# Patient Record
Sex: MALE | Race: BLACK OR AFRICAN AMERICAN | NOT HISPANIC OR LATINO
[De-identification: names, ages, dates, MRNs, and addresses within clinical notes are randomized per-mention and may not be internally consistent; named-entity substitution may affect disease eponyms.]

---

## 2017-07-25 ENCOUNTER — APPOINTMENT (OUTPATIENT)
Dept: HEART AND VASCULAR | Facility: CLINIC | Age: 52
End: 2017-07-25
Payer: COMMERCIAL

## 2017-07-25 PROCEDURE — 99203 OFFICE O/P NEW LOW 30 MIN: CPT

## 2017-08-22 ENCOUNTER — APPOINTMENT (OUTPATIENT)
Dept: HEART AND VASCULAR | Facility: CLINIC | Age: 52
End: 2017-08-22
Payer: COMMERCIAL

## 2017-08-22 VITALS
DIASTOLIC BLOOD PRESSURE: 66 MMHG | HEART RATE: 85 BPM | RESPIRATION RATE: 14 BRPM | HEIGHT: 73 IN | BODY MASS INDEX: 32.34 KG/M2 | SYSTOLIC BLOOD PRESSURE: 104 MMHG | WEIGHT: 244.04 LBS | TEMPERATURE: 98.1 F | OXYGEN SATURATION: 96 %

## 2017-08-22 DIAGNOSIS — I10 ESSENTIAL (PRIMARY) HYPERTENSION: ICD-10-CM

## 2017-08-22 DIAGNOSIS — R06.02 SHORTNESS OF BREATH: ICD-10-CM

## 2017-08-22 DIAGNOSIS — Z82.49 FAMILY HISTORY OF ISCHEMIC HEART DISEASE AND OTHER DISEASES OF THE CIRCULATORY SYSTEM: ICD-10-CM

## 2017-08-22 DIAGNOSIS — R94.31 ABNORMAL ELECTROCARDIOGRAM [ECG] [EKG]: ICD-10-CM

## 2017-08-22 PROCEDURE — 93015 CV STRESS TEST SUPVJ I&R: CPT

## 2017-08-22 PROCEDURE — 99214 OFFICE O/P EST MOD 30 MIN: CPT | Mod: 25

## 2017-08-22 RX ORDER — ENALAPRIL MALEATE 10 MG/1
10 TABLET ORAL
Refills: 0 | Status: ACTIVE | COMMUNITY

## 2019-01-26 ENCOUNTER — MOBILE ON CALL (OUTPATIENT)
Age: 54
End: 2019-01-26

## 2021-03-28 ENCOUNTER — EMERGENCY (EMERGENCY)
Facility: HOSPITAL | Age: 56
LOS: 1 days | Discharge: HOME | End: 2021-03-28
Attending: EMERGENCY MEDICINE | Admitting: EMERGENCY MEDICINE
Payer: COMMERCIAL

## 2021-03-28 VITALS
HEIGHT: 73 IN | TEMPERATURE: 98 F | RESPIRATION RATE: 18 BRPM | DIASTOLIC BLOOD PRESSURE: 95 MMHG | OXYGEN SATURATION: 98 % | SYSTOLIC BLOOD PRESSURE: 183 MMHG | HEART RATE: 75 BPM | WEIGHT: 220.02 LBS

## 2021-03-28 PROCEDURE — 99283 EMERGENCY DEPT VISIT LOW MDM: CPT | Mod: 25

## 2021-03-28 PROCEDURE — 99284 EMERGENCY DEPT VISIT MOD MDM: CPT

## 2021-03-28 PROCEDURE — 96372 THER/PROPH/DIAG INJ SC/IM: CPT

## 2021-03-28 RX ORDER — DIAZEPAM 5 MG
5 TABLET ORAL ONCE
Refills: 0 | Status: DISCONTINUED | OUTPATIENT
Start: 2021-03-28 | End: 2021-03-28

## 2021-03-28 RX ORDER — DIAZEPAM 5 MG
1 TABLET ORAL
Qty: 10 | Refills: 0
Start: 2021-03-28

## 2021-03-28 RX ORDER — IBUPROFEN 200 MG
1 TABLET ORAL
Qty: 20 | Refills: 0
Start: 2021-03-28

## 2021-03-28 RX ORDER — KETOROLAC TROMETHAMINE 30 MG/ML
30 SYRINGE (ML) INJECTION ONCE
Refills: 0 | Status: DISCONTINUED | OUTPATIENT
Start: 2021-03-28 | End: 2021-03-28

## 2021-03-28 RX ADMIN — Medication 30 MILLIGRAM(S): at 14:35

## 2021-03-28 RX ADMIN — Medication 5 MILLIGRAM(S): at 14:35

## 2021-03-28 NOTE — ED PROVIDER NOTE - PHYSICAL EXAMINATION
VITAL SIGNS: I have reviewed nursing notes and confirm.  CONSTITUTIONAL: Well-developed; well-nourished; in no acute distress.   SKIN:  Warm and dry, no acute rash.   HEAD:  normocephalic, atraumatic.  EYES: PERRL.  EOM intact; conjunctiva and sclera clear.  ENT: No nasal discharge; airway clear.   NECK: Supple; no midline cervical spine pain/tenderness/stepoff/deformity.   CARD: S1, S2 normal; no murmurs, gallops, or rubs. Regular rate and rhythm.   RESP:  Clear to auscultation b/l, no wheezes, rales or rhonchi.  ABD: Normal bowel sounds; soft; non-distended; non-tender; no guarding/rebound.  EXT: Normal ROM. No clubbing, cyanosis or edema. 2+ pulses to b/l ue/le.  MSK:  + TTP over left low back over left sacrum, no overlying erythema or skin breakdown/rashes.  No midline thoracic or lumbar spine pain/tenderness/stepoff/deformity.   NEURO: Alert, oriented, grossly unremarkable.  5/5 strength x 4 extremities against gravity and external force.  No drift x 4 extremities.  Sensation intact and symmetric x 4 extremities, no saddle anesthesia.  Bilateral patellar DTRs intact and symmetric.  No facial asymmetry.    PSYCH: Cooperative, mood and affect appropriate. VITAL SIGNS: I have reviewed nursing notes and confirm.  CONSTITUTIONAL: Well-developed; well-nourished; in no acute distress.   SKIN:  Warm and dry, no acute rash.   HEAD:  normocephalic, atraumatic.  EYES: PERRL.  EOM intact; conjunctiva and sclera clear.  ENT: No nasal discharge; airway clear.   NECK: Supple; no midline cervical spine pain/tenderness/stepoff/deformity.   CARD: S1, S2 normal; no murmurs, gallops, or rubs. Regular rate and rhythm.   RESP:  Clear to auscultation b/l, no wheezes, rales or rhonchi.  ABD: Normal bowel sounds; soft; non-distended; non-tender; no guarding/rebound.  EXT: Normal ROM. No clubbing, cyanosis or edema. 2+ pulses to b/l ue/le.  MSK:  + TTP over left low back over left iliac crest, no overlying erythema or skin breakdown/rashes.  No midline thoracic or lumbar spine pain/tenderness/stepoff/deformity.   NEURO: Alert, oriented, grossly unremarkable.  5/5 strength x 4 extremities against gravity and external force.  No drift x 4 extremities.  Sensation intact and symmetric x 4 extremities, no saddle anesthesia.  Bilateral patellar DTRs intact and symmetric.  No facial asymmetry.    PSYCH: Cooperative, mood and affect appropriate.

## 2021-03-28 NOTE — ED PROVIDER NOTE - NS ED ROS FT
Constitutional: No fever or chills.   Eyes: No pain, blurry vision, or discharge.  ENMT: No hearing changes, pain, discharge or infections. No neck pain or stiffness.  Cardiac: No chest pain, SOB or edema. No chest pain with exertion.  Respiratory: No cough or respiratory distress. No hemoptysis. No history of asthma or RAD.  GI: No nausea, vomiting, diarrhea or abdominal pain.  : No dysuria, frequency or burning.  MS: No myalgia, muscle weakness, joint pain, + low back pain.  Neuro: No headache or weakness. No LOC.  Skin: No skin rash.   Endocrine: No history of thyroid disease or diabetes.  Except as documented in the HPI, all other systems are negative.

## 2021-03-28 NOTE — ED PROVIDER NOTE - PATIENT PORTAL LINK FT
You can access the FollowMyHealth Patient Portal offered by [facility] by registering at the following website: [email or web address]. By joining [employer]’s FollowMyHealth portal, you will also be able to view your health information using other applications (apps) compatible with our system.

## 2021-03-28 NOTE — ED PROVIDER NOTE - NSFOLLOWUPINSTRUCTIONS_ED_ALL_ED_FT
Please follow up with your primary physician in 1-2 days for re evaluation.  Please return to ER immediately should your symptoms worsen or if you have any concern prior to this recommended follow up.          Acute Low Back Pain    WHAT YOU NEED TO KNOW:    Acute low back pain is sudden discomfort that lasts up to 6 weeks and makes activity difficult.    DISCHARGE INSTRUCTIONS:    Return to the emergency department if:   •You have severe pain.      •You have sudden stiffness and heaviness on both buttocks down to both legs.      •You have numbness or weakness in one leg, or pain in both legs.      •You have numbness in your genital area or across your lower back.      •You cannot control your urine or bowel movements.      Call your doctor if:   •You have a fever.      •You have pain at night or when you rest.      •Your pain does not get better with treatment.      •You have pain that worsens when you cough or sneeze.      •You suddenly feel something pop or snap in your back.      •You have questions or concerns about your condition or care.      Medicines: You may need any of the following:  •NSAIDs, such as ibuprofen, help decrease swelling, pain, and fever. This medicine is available with or without a doctor's order. NSAIDs can cause stomach bleeding or kidney problems in certain people. If you take blood thinner medicine, always ask your healthcare provider if NSAIDs are safe for you. Always read the medicine label and follow directions.      •Acetaminophen decreases pain and fever. It is available without a doctor's order. Ask how much to take and how often to take it. Follow directions. Read the labels of all other medicines you are using to see if they also contain acetaminophen, or ask your doctor or pharmacist. Acetaminophen can cause liver damage if not taken correctly. Do not use more than 4 grams (4,000 milligrams) total of acetaminophen in one day.       •Muscle relaxers decrease pain by relaxing the muscles in your lower spine.      •Prescription pain medicine may be given. Ask your healthcare provider how to take this medicine safely. Some prescription pain medicines contain acetaminophen. Do not take other medicines that contain acetaminophen without talking to your healthcare provider. Too much acetaminophen may cause liver damage. Prescription pain medicine may cause constipation. Ask your healthcare provider how to prevent or treat constipation.       Self-care:   •Stay active as much as you can without causing more pain. Bed rest could make your back pain worse. Start with some light exercises, such as walking. Avoid heavy lifting until your pain is gone. Ask for more information about the activities or exercises that are right for you.   Family Walking for Exercise           •Apply heat on your back for 20 to 30 minutes every 2 hours for as many days as directed. Heat helps decrease pain and muscle spasms. Alternate heat and ice.      •Apply ice on your back for 15 to 20 minutes every hour or as directed. Use an ice pack, or put crushed ice in a plastic bag. Cover it with a towel before you apply it to your skin. Ice helps prevent tissue damage and decreases swelling and pain.      Prevent acute low back pain:   •Use proper body mechanics. ?Bend at the hips and knees when you  objects. Do not bend from the waist. Use your leg muscles as you lift the load. Do not use your back. Keep the object close to your chest as you lift it. Try not to twist or lift anything above your waist.      ?Change your position often when you stand for long periods of time. Rest one foot on a small box or footrest, and then switch to the other foot often.      ?Try not to sit for long periods of time. When you do, sit in a straight-backed chair with your feet flat on the floor. Never reach, pull, or push while you are sitting.      •Do exercises that strengthen your back muscles. Warm up before you exercise. Ask your healthcare provider the best exercises for you.      •Maintain a healthy weight. Ask your healthcare provider what a healthy weight is for you. Ask him or her to help you create a weight loss plan if you are overweight.      Follow up with your doctor as directed: Return for a follow-up visit if you still have pain after 1 to 3 weeks of treatment. You may need to visit an orthopedist if your back pain lasts longer than 12 weeks. Write down your questions so you remember to ask them during your visits.       © Copyright [employer] 2021           back to top                          © Copyright [employer] 2021

## 2021-03-28 NOTE — ED PROVIDER NOTE - OBJECTIVE STATEMENT
55 year old male with history of HTN presents to ED with concern for low back pain radiating into LLE over the past week.  Patient states he was cleaning his stove last week when he "maybe twisted funny" and woke up the next morning with pain to left low back radiating into left buttock and left proximal lower extremity.  Patient notes he went to [facility] where he was prescribed Flexeril for his discomfort and notes he has also intermittently been taking advil with some improvement in pain until today, stating the pain is now feeling worse.  He denies associated paresthesias x 4 extremities (despite triage note reading "right leg numbness"), loss of bowel/bladder function, urinary retention, lower extremity weakness, midline back/spine pain, rashes, recent travel, swelling to extremities or any additional acute complaints or concerns at this time.

## 2021-03-28 NOTE — ED ADULT TRIAGE NOTE - CHIEF COMPLAINT QUOTE
x 1 week of lower back pain. 10/10 lower back pain. pt reports " I was cleaning the oven and I feel like I pulled something." reports L sided lower back pain radiating to L thigh and inner groin. R sided leg numbness started yesterday. no loss/ bowel movements

## 2021-03-28 NOTE — ED PROVIDER NOTE - CLINICAL SUMMARY MEDICAL DECISION MAKING FREE TEXT BOX
Patient presents to ED with concern for low back pain developed following cleaning his stove/oven 1 week ago.  Taking advil and flexeril intermittently with some improvement in symptoms until today when pain seemed to be worsening.  No red flags for back pain.  Patient given valium, toradol in ED with good improvement in pain.  Ambulating in ED without difficulty.  Will plan for discharge home with instruction for PCP and spine follow up (info for spine on call provided).  Will also provide rx for valium, motrin.  Red flags are discussed with patient and strict return to ED precautions given.  Patient is advised to follow up with their PCP in 1-2 days without fail.  Patient instructed to return to ED immediately should their symptoms worsen or if there is any concern prior to the recommended PCP follow up.  Patient is aware of plan and verbalizes their understanding.  Will discharge home at this time.

## 2021-03-28 NOTE — ED PROVIDER NOTE - CARE PROVIDER_API CALL
DAmico, Randy S (MD)  Neurosurgery  [address] [place] [address]  Phone: [phone]  Fax: [phone]  Follow Up Time:

## 2021-04-01 DIAGNOSIS — I10 ESSENTIAL (PRIMARY) HYPERTENSION: ICD-10-CM
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
## 2021-04-01 DIAGNOSIS — M54.5 LOW BACK PAIN: ICD-10-CM
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
## 2021-04-01 DIAGNOSIS — M54.42 LUMBAGO WITH SCIATICA, LEFT SIDE: ICD-10-CM

## 2021-04-13 ENCOUNTER — EMERGENCY (EMERGENCY)
Facility: HOSPITAL | Age: 56
LOS: 1 days | Discharge: HOME | End: 2021-04-13
Admitting: EMERGENCY MEDICINE
Payer: COMMERCIAL

## 2021-04-13 VITALS
SYSTOLIC BLOOD PRESSURE: 143 MMHG | WEIGHT: 259.93 LBS | HEIGHT: 73 IN | TEMPERATURE: 98 F | OXYGEN SATURATION: 97 % | DIASTOLIC BLOOD PRESSURE: 95 MMHG | RESPIRATION RATE: 18 BRPM | HEART RATE: 98 BPM

## 2021-04-13 DIAGNOSIS — M79.652 PAIN IN LEFT THIGH: ICD-10-CM

## 2021-04-13 DIAGNOSIS — M54.5 LOW BACK PAIN: ICD-10-CM

## 2021-04-13 DIAGNOSIS — Z79.1 LONG TERM (CURRENT) USE OF NON-STEROIDAL ANTI-INFLAMMATORIES (NSAID): ICD-10-CM

## 2021-04-13 PROCEDURE — 99283 EMERGENCY DEPT VISIT LOW MDM: CPT | Mod: 25

## 2021-04-13 PROCEDURE — 96372 THER/PROPH/DIAG INJ SC/IM: CPT

## 2021-04-13 PROCEDURE — 99284 EMERGENCY DEPT VISIT MOD MDM: CPT

## 2021-04-13 RX ORDER — KETOROLAC TROMETHAMINE 30 MG/ML
30 SYRINGE (ML) INJECTION ONCE
Refills: 0 | Status: DISCONTINUED | OUTPATIENT
Start: 2021-04-13 | End: 2021-04-13

## 2021-04-13 RX ORDER — DIAZEPAM 5 MG
5 TABLET ORAL ONCE
Refills: 0 | Status: DISCONTINUED | OUTPATIENT
Start: 2021-04-13 | End: 2021-04-13

## 2021-04-13 RX ADMIN — Medication 30 MILLIGRAM(S): at 14:55

## 2021-04-13 RX ADMIN — Medication 5 MILLIGRAM(S): at 14:18

## 2021-04-13 RX ADMIN — Medication 30 MILLIGRAM(S): at 14:17

## 2021-04-13 NOTE — ED ADULT NURSE NOTE - OBJECTIVE STATEMENT
55y M, A&ox3, presents to ed for left buttock radiating down left leg pain worsening today. reports recently dx with sciatica and given prescriptions however is starting to run low on medications and did not take medications today. Reports "feels the same" no new injury fall nor trauma.

## 2021-04-13 NOTE — ED PROVIDER NOTE - PATIENT PORTAL LINK FT
You can access the FollowMyHealth Patient Portal offered by [facility] by registering at the following website: [email or web address]. By joining [employer]’s FollowMyHealth portal, you will also be able to view your health information using other applications (apps) compatible with our system.

## 2021-04-13 NOTE — ED PROVIDER NOTE - MUSCULOSKELETAL, MLM
no midline spine tenderness, no lateral lumbar tenderness, range of motion is not limited, no muscle or joint tenderness

## 2021-04-13 NOTE — ED ADULT TRIAGE NOTE - CHIEF COMPLAINT QUOTE
Pt c/o L lower back/buttocks pain radiating into L leg with intermittent numbness since 2/38. Dx with sciatica on last visit, did not take any pain medicine today. No UE weakness noted. Ambulates with steady gait. Denies urinary symptoms.

## 2021-04-13 NOTE — ED PROVIDER NOTE - CLINICAL SUMMARY MEDICAL DECISION MAKING FREE TEXT BOX
54 y/o m presents c/o pain to left low back radiating to left thigh which worsened today; no neuro deficits in ED, pt ambulatory without difficulty.  Discussed with pt he will likely need outpatient MRI, no indication for emergent MRI in ED, no concern for cauda equina.  Pt given toradol/valium in ED, will reassess for improvement.

## 2021-04-13 NOTE — ED PROVIDER NOTE - OBJECTIVE STATEMENT
56 y/o m presents c/o left low back pain radiating to left leg for the past 2 weeks.  Pt was seen at , given flexeril which he reports minimal improvement with.  Pt was seen in ED last week for this as well, given valium and ibuprofen which he states has been helping.  Pt was advised to f/u with neurosurgery for possible MRI which he hasn't done yet, waiting on referral from pmd.  Pt stating he woke up this morning and had more pain radiating to left thigh and so came to ED.  Pt didn't take anything for pain today.  Pt denies numbness/tingling to ext, weakness, saddle anesthesia, bowel/bladder incontinence, all other ROS negative.

## 2021-05-03 PROBLEM — M54.30 SCIATICA, UNSPECIFIED SIDE: Chronic | Status: ACTIVE | Noted: 2021-04-13

## 2021-05-05 ENCOUNTER — OUTPATIENT (OUTPATIENT)
Dept: OUTPATIENT SERVICES | Facility: HOSPITAL | Age: 56
LOS: 1 days | End: 2021-05-05
Payer: COMMERCIAL

## 2021-05-05 ENCOUNTER — APPOINTMENT (OUTPATIENT)
Dept: SPINE | Facility: CLINIC | Age: 56
End: 2021-05-05
Payer: COMMERCIAL

## 2021-05-05 ENCOUNTER — RESULT REVIEW (OUTPATIENT)
Age: 56
End: 2021-05-05

## 2021-05-05 VITALS
HEIGHT: 73 IN | SYSTOLIC BLOOD PRESSURE: 129 MMHG | RESPIRATION RATE: 21 BRPM | TEMPERATURE: 96.9 F | WEIGHT: 250 LBS | BODY MASS INDEX: 33.13 KG/M2 | OXYGEN SATURATION: 94 % | HEART RATE: 104 BPM | DIASTOLIC BLOOD PRESSURE: 87 MMHG

## 2021-05-05 DIAGNOSIS — I10 ESSENTIAL (PRIMARY) HYPERTENSION: ICD-10-CM

## 2021-05-05 DIAGNOSIS — M54.16 RADICULOPATHY, LUMBAR REGION: ICD-10-CM

## 2021-05-05 DIAGNOSIS — Z78.9 OTHER SPECIFIED HEALTH STATUS: ICD-10-CM

## 2021-05-05 PROCEDURE — 99072 ADDL SUPL MATRL&STAF TM PHE: CPT

## 2021-05-05 PROCEDURE — 99213 OFFICE O/P EST LOW 20 MIN: CPT

## 2021-05-05 PROCEDURE — 72084 X-RAY EXAM ENTIRE SPI 6/> VW: CPT

## 2021-05-05 PROCEDURE — 99203 OFFICE O/P NEW LOW 30 MIN: CPT

## 2021-05-05 PROCEDURE — 72082 X-RAY EXAM ENTIRE SPI 2/3 VW: CPT

## 2021-05-05 PROCEDURE — 72084 X-RAY EXAM ENTIRE SPI 6/> VW: CPT | Mod: 26

## 2021-05-05 PROCEDURE — 72114 X-RAY EXAM L-S SPINE BENDING: CPT

## 2021-05-05 NOTE — REASON FOR VISIT
[Initial Visit] : an initial visit for [Back Pain] : back pain [Radiculopathy] : radiculopathy [FreeTextEntry2] : referred by PCP (Dr. Delio Jimenez)

## 2021-05-05 NOTE — HISTORY OF PRESENT ILLNESS
[de-identified] : Patient is a 54 yo M with PMH of HTN, sciatica presents for neurosurgical consultation.\par He reports chronic sciatic back pain for over 10 years initially started without injury which got significantly worse for 5-6 months with new onset of L leg numbness/weakness for ~ 2 months. He describes pain as severe intermittent pulling/ripping pain on his lower back radiating to LEFT posterior leg above knee and numbness on L inner thigh. Pain is worsening by lying on L side/walking/standing and minimally improving with stretching/PT. However, he noticed worsening LEFT leg weakness causing difficulty ambulating with multiple near fall incidence. He was seen in urgent care and ED where he was treated with injections/oral medications without relief. Currently he has been going for PT which has been moderately relieve his pain but c/o persistent weakness. He denies saddle anesthesia, BB dysfunction. He was seen by PCP and referred to spine sx for further evlauation.

## 2021-05-05 NOTE — PHYSICAL EXAM
[Antalgic] : antalgic [] : Motor: [UE Motor Strength NL] : Motor strength of the bilateral upper extremities is normal [___/5] : left ([unfilled]/5) [Motor Strength Lower Extremities] : left (5/5) [4+] : left patella 4+ [2+] : left ankle jerk 2+

## 2021-05-05 NOTE — ASSESSMENT
[FreeTextEntry1] : abnoraml neuro exam: + [place], antalgic gait, + straight raised leg on L side, L hip flexion weakness, LLE hyperreflex today.\par \par PLAN\par - MRI L-spine wo\par - Xray scoliosis, L spine 6 views\par - RTC after images to review with Dr. Dukes

## 2021-06-22 ENCOUNTER — APPOINTMENT (OUTPATIENT)
Dept: SPINE | Facility: CLINIC | Age: 56
End: 2021-06-22
Payer: COMMERCIAL

## 2021-06-22 VITALS
WEIGHT: 250 LBS | RESPIRATION RATE: 18 BRPM | OXYGEN SATURATION: 97 % | HEIGHT: 73 IN | DIASTOLIC BLOOD PRESSURE: 82 MMHG | HEART RATE: 98 BPM | BODY MASS INDEX: 33.13 KG/M2 | SYSTOLIC BLOOD PRESSURE: 141 MMHG | TEMPERATURE: 96.9 F

## 2021-06-22 DIAGNOSIS — M51.26 OTHER INTERVERTEBRAL DISC DISPLACEMENT, LUMBAR REGION: ICD-10-CM

## 2021-06-22 PROCEDURE — 99215 OFFICE O/P EST HI 40 MIN: CPT

## 2021-06-22 NOTE — HISTORY OF PRESENT ILLNESS
[de-identified] : Slava had an episode in March of severe back and leg pain.  He was out of work for some time until Memorial Day.  He has resumed biking and his pain is dramatically improved.  The weakness in his leg is also improved

## 2021-06-22 NOTE — REASON FOR VISIT
[Follow-Up Visit] : a follow-up visit for [Herniated Discs] : herniated discs [Radiculopathy] : radiculopathy

## 2023-08-22 NOTE — DISCUSSION/SUMMARY
[de-identified] : I had a long discussion with Salva about the natural history of lumbar disc disease as well as lumbar herniated disc.  He very clearly is fit the natural history to a T and I recommend that he proceed with physical therapy as well as aerobic exercise.  I provided him with my card including my cell phone and my email address if this happens again.  I do not recommend surgery at this time.  I answered all of his questions to the best my ability Hide Additional Notes?: No Detail Level: Simple

## 2024-12-12 NOTE — ED ADULT NURSE NOTE - CADM POA PRESS ULCER
[facility]  Chronic Interventional Pain Service  Pre-operative History and Physical Note  Status Update    History and Physical completed in the last 30 days.  The History and Physical is documented in EPIC. I have reviewed. The H&P in the holding area.  I have examined the patient and I do not have any pertinent updates.    Buzz Davila DO  Dept. of Anesthesiology  Division of Interventional Pain Medicine  [facility]     No